# Patient Record
Sex: FEMALE | URBAN - METROPOLITAN AREA
[De-identification: names, ages, dates, MRNs, and addresses within clinical notes are randomized per-mention and may not be internally consistent; named-entity substitution may affect disease eponyms.]

---

## 2021-06-17 ENCOUNTER — APPOINTMENT (OUTPATIENT)
Dept: URBAN - METROPOLITAN AREA CLINIC 273 | Age: 21
Setting detail: DERMATOLOGY
End: 2021-06-17

## 2021-06-17 DIAGNOSIS — D22 MELANOCYTIC NEVI: ICD-10-CM

## 2021-06-17 DIAGNOSIS — L738 OTHER SPECIFIED DISEASES OF HAIR AND HAIR FOLLICLES: ICD-10-CM

## 2021-06-17 DIAGNOSIS — L663 OTHER SPECIFIED DISEASES OF HAIR AND HAIR FOLLICLES: ICD-10-CM

## 2021-06-17 PROBLEM — L02.222 FURUNCLE OF BACK [ANY PART, EXCEPT BUTTOCK]: Status: ACTIVE | Noted: 2021-06-17

## 2021-06-17 PROBLEM — L02.12 FURUNCLE OF NECK: Status: ACTIVE | Noted: 2021-06-17

## 2021-06-17 PROBLEM — D22.62 MELANOCYTIC NEVI OF LEFT UPPER LIMB, INCLUDING SHOULDER: Status: ACTIVE | Noted: 2021-06-17

## 2021-06-17 PROCEDURE — OTHER COUNSELING: OTHER

## 2021-06-17 PROCEDURE — 99203 OFFICE O/P NEW LOW 30 MIN: CPT

## 2021-06-17 PROCEDURE — OTHER PRESCRIPTION: OTHER

## 2021-06-17 PROCEDURE — OTHER SUNSCREEN RECOMMENDATIONS: OTHER

## 2021-06-17 PROCEDURE — OTHER REASSURANCE: OTHER

## 2021-06-17 RX ORDER — CLINDAMYCIN PHOSPHATE 10 MG/1
SWAB TOPICAL
Qty: 1 | Refills: 6 | Status: ERX | COMMUNITY
Start: 2021-06-17

## 2021-06-17 ASSESSMENT — LOCATION DETAILED DESCRIPTION DERM
LOCATION DETAILED: LEFT PROXIMAL DORSAL FOREARM
LOCATION DETAILED: SUPERIOR THORACIC SPINE
LOCATION DETAILED: LEFT INFERIOR LATERAL NECK
LOCATION DETAILED: RIGHT SUPERIOR MEDIAL UPPER BACK

## 2021-06-17 ASSESSMENT — LOCATION ZONE DERM
LOCATION ZONE: NECK
LOCATION ZONE: TRUNK
LOCATION ZONE: ARM

## 2021-06-17 ASSESSMENT — LOCATION SIMPLE DESCRIPTION DERM
LOCATION SIMPLE: LEFT FOREARM
LOCATION SIMPLE: RIGHT UPPER BACK
LOCATION SIMPLE: LEFT ANTERIOR NECK
LOCATION SIMPLE: UPPER BACK

## 2021-06-17 NOTE — HPI: SKIN LESION
What Type Of Note Output Would You Prefer (Optional)?: Bullet Format
How Severe Is Your Skin Lesion?: mild
Has Your Skin Lesion Been Treated?: not been treated
Is This A New Presentation, Or A Follow-Up?: Mole
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 Just wanted it checked for reassurance.

## 2021-06-17 NOTE — HPI: PIMPLES (ACNE)
What Type Of Note Output Would You Prefer (Optional)?: Bullet Format
How Severe Is Your Acne?: mild
Is This A New Presentation, Or A Follow-Up?: Acne
Additional Comments (Use Complete Sentences): She is used clindamycin gel for face

## 2022-03-07 ENCOUNTER — OFFICE (OUTPATIENT)
Dept: URBAN - METROPOLITAN AREA CLINIC 84 | Facility: CLINIC | Age: 22
End: 2022-03-07

## 2022-03-07 VITALS
HEART RATE: 140 BPM | TEMPERATURE: 97.6 F | HEIGHT: 62 IN | DIASTOLIC BLOOD PRESSURE: 80 MMHG | SYSTOLIC BLOOD PRESSURE: 140 MMHG | WEIGHT: 133 LBS

## 2022-03-07 DIAGNOSIS — K92.1 MELENA: ICD-10-CM

## 2022-03-07 PROCEDURE — 99204 OFFICE O/P NEW MOD 45 MIN: CPT | Performed by: SPECIALIST

## 2022-03-09 ENCOUNTER — AMBULATORY SURGICAL CENTER (OUTPATIENT)
Dept: URBAN - METROPOLITAN AREA SURGERY 19 | Facility: SURGERY | Age: 22
End: 2022-03-09

## 2022-03-09 ENCOUNTER — OFFICE (OUTPATIENT)
Dept: URBAN - METROPOLITAN AREA PATHOLOGY 24 | Facility: PATHOLOGY | Age: 22
End: 2022-03-09

## 2022-03-09 DIAGNOSIS — D12.5 BENIGN NEOPLASM OF SIGMOID COLON: ICD-10-CM

## 2022-03-09 DIAGNOSIS — K92.1 MELENA: ICD-10-CM

## 2022-03-09 LAB
COLONOSCOPY STUDY: (no result)
COLONOSCOPY STUDY: (no result)

## 2022-03-09 PROCEDURE — 88305 TISSUE EXAM BY PATHOLOGIST: CPT | Performed by: STUDENT IN AN ORGANIZED HEALTH CARE EDUCATION/TRAINING PROGRAM

## 2022-03-09 PROCEDURE — 45385 COLONOSCOPY W/LESION REMOVAL: CPT | Performed by: SPECIALIST

## 2022-04-19 ENCOUNTER — OFFICE (OUTPATIENT)
Dept: URBAN - METROPOLITAN AREA CLINIC 67 | Facility: CLINIC | Age: 22
End: 2022-04-19

## 2022-04-19 VITALS
WEIGHT: 134 LBS | HEIGHT: 62 IN | DIASTOLIC BLOOD PRESSURE: 78 MMHG | SYSTOLIC BLOOD PRESSURE: 138 MMHG | HEART RATE: 91 BPM

## 2022-04-19 DIAGNOSIS — Z86.010 PERSONAL HISTORY OF COLONIC POLYPS: ICD-10-CM

## 2022-04-19 DIAGNOSIS — K58.9 IRRITABLE BOWEL SYNDROME WITHOUT DIARRHEA: ICD-10-CM

## 2022-04-19 PROCEDURE — 99214 OFFICE O/P EST MOD 30 MIN: CPT | Performed by: SPECIALIST

## 2023-11-01 ENCOUNTER — OFFICE (OUTPATIENT)
Dept: URBAN - METROPOLITAN AREA CLINIC 67 | Facility: CLINIC | Age: 23
End: 2023-11-01

## 2023-11-01 VITALS — WEIGHT: 130 LBS | HEIGHT: 62 IN

## 2023-11-01 DIAGNOSIS — Z86.010 PERSONAL HISTORY OF COLONIC POLYPS: ICD-10-CM

## 2023-11-01 DIAGNOSIS — K59.00 CONSTIPATION, UNSPECIFIED: ICD-10-CM

## 2023-11-01 DIAGNOSIS — K92.1 MELENA: ICD-10-CM

## 2023-11-01 PROCEDURE — 99213 OFFICE O/P EST LOW 20 MIN: CPT | Mod: 95 | Performed by: NURSE PRACTITIONER

## 2023-11-01 RX ORDER — LINACLOTIDE 72 UG/1
CAPSULE, GELATIN COATED ORAL
Qty: 12 | Refills: 0 | Status: ACTIVE
Start: 2023-11-01

## 2023-11-01 NOTE — SERVICENOTES
Telehealth Platform Used: D[employer]im[employer]
Location of patient: Home
Location of provider: Office
Other persons participating: None

## 2023-11-01 NOTE — SERVICEHPINOTES
4/19/2022 Last Visit with Dr. Valderrama:Fabien Mijares is seen today for a follow-up visit. This patient had a colonoscopy 6 weeks ago. Procedure findings included colon polyps. otherwise was normal. She overall is doing well she does have some occasional constipation and minimal rectal bleeding. There's been no diarrhea. She does not take the hyoscyamine. She feels stress makes her IBS symptoms worse.. Plan from 4/19/2022: brIrritable bowel syndrome I am pleased to say Delon is doing well. She does have some occasional constipation and minimal rectal bleeding. I counseled her on a high-fiber diet, fiber supplementation and taking MiraLAX as needed. She did have recent colonoscopy which did reveal a medium-sized polyp which was removed. She does need colonoscopy in 3 years. She did not want any further blood work such as thyroid testing today. We will plan to see her back for her IBS as needed
br
ally canales br Today 11/1/2023 
br She reports via telehealth with complaints blood in stool x 1. br
ally She has stomach pain that started 3 weeks ago. She went to a Walk In and had a KUB that showed a large amount of stool. She took a Dulcolax that caused a lot of pain until she had a bowel movement. The pain then went away. 
br
ally She has been having constipation for a while and takes Miralax. She also took semaglutide for a time period since we last saw her and she believes it made her constipation worse. br
ally A couple days ago, she took an increased dose of Miralax and then had harder stool with bright red blood in stool. She has not seen blood in the stool since then. She has had multiple bowel movements over the last couple days due to increase in Miralax. ally Villarreal since her polyp removal, she has been very worried about any medical conditions. Procedures br3/9/2022 Colonoscopy with Dr. Valderrama- 20mm polyp, path: TV adenoma, repeat colon in 3 years

## 2023-11-09 ENCOUNTER — OFFICE (OUTPATIENT)
Dept: URBAN - METROPOLITAN AREA CLINIC 67 | Facility: CLINIC | Age: 23
End: 2023-11-09

## 2023-11-09 VITALS — WEIGHT: 129 LBS | HEIGHT: 62 IN

## 2023-11-09 DIAGNOSIS — R10.84 GENERALIZED ABDOMINAL PAIN: ICD-10-CM

## 2023-11-09 DIAGNOSIS — R11.0 NAUSEA: ICD-10-CM

## 2023-11-09 DIAGNOSIS — K59.00 CONSTIPATION, UNSPECIFIED: ICD-10-CM

## 2023-11-09 DIAGNOSIS — Z86.010 PERSONAL HISTORY OF COLONIC POLYPS: ICD-10-CM

## 2023-11-09 PROCEDURE — 99213 OFFICE O/P EST LOW 20 MIN: CPT | Performed by: NURSE PRACTITIONER

## 2023-11-09 NOTE — SERVICEHPINOTES
4/19/2022 Last Visit with Dr. Valderrama: Katlinagustina Mijares is seen today for a follow-up visit. This patient had a colonoscopy 6 weeks ago. Procedure findings included colon polyps. otherwise was normal. She overall is doing well she does have some occasional constipation and minimal rectal bleeding. There's been no diarrhea. She does not take the hyoscyamine. She feels stress makes her IBS symptoms worse.. Plan from 4/19/2022: brIrritable bowel syndrome I am pleased to say Delon is doing well. She does have some occasional constipation and minimal rectal bleeding. I counseled her on a high-fiber diet, fiber supplementation and taking MiraLAX as needed. She did have recent colonoscopy which did reveal a medium-sized polyp which was removed. She does need colonoscopy in 3 years. She did not want any further blood work such as thyroid testing today. We will plan to see her back for her IBS as needed 11/1/2023 Interval History: brShe reports via telehealth with complaints blood in stool x 1. She has stomach pain that started 3 weeks ago. She went to a Walk In and had a KUB that showed a large amount of stool. She took a Dulcolax that caused a lot of pain until she had a bowel movement. The pain then went away. She has been having constipation for a while and takes Miralax. She also took semaglutide for a time period since we last saw her and she believes it made her constipation worse. A couple days ago, she took an increased dose of Miralax and then had harder stool with bright red blood in stool. She has not seen blood in the stool since then. She has had multiple bowel movements over the last couple days due to increase in Miralax. Ever since her polyp removal, she has been very worried about any medical conditions. Plan from 11/1/2023: br1. Will trial a 12 day sample of Linzess since pt has never tried anything for IBS-C br2. Will have pt continue to monitor for blood in stool since she has only 1 episode after constipation/hard stool br3. Will call pt in 2 weeks for follow up of symptoms br4. Colonoscopy due 3/2025 brToday 11/9/2023 DamianRicci Mijares is here via telehealth. She started taking the Linzess and it worked the first couple days, but had abdominal pain when she took it. She took it for 3 days and then stopped due to the pain. br
Luis has continued to have abdominal pain daily. ally canales Today she has had 3 formed bowel movements a day. She has not had any blood in her stool. ally canales She has been feeling very stressed recently as she is about to have to move to [place] next Friday for work. She will live there for 1 year, but wants to continue to follow up with us. ally canales She also feels nauseous and has been taking Zofran that helps symptoms. br
br
br Imaging 
br 10/10/2023 KUB- moderate colonic stool burden, no obstruction Procedures br3/9/2022 Colonoscopy with Dr. Valderrama- 20mm polyp, path: TV adenoma, repeat colon in 3 years

## 2023-11-16 ENCOUNTER — OFFICE (OUTPATIENT)
Dept: URBAN - METROPOLITAN AREA CLINIC 67 | Facility: CLINIC | Age: 23
End: 2023-11-16

## 2023-11-16 VITALS — WEIGHT: 129 LBS | HEIGHT: 62 IN

## 2023-11-16 DIAGNOSIS — R10.84 GENERALIZED ABDOMINAL PAIN: ICD-10-CM

## 2023-11-16 DIAGNOSIS — Z86.010 PERSONAL HISTORY OF COLONIC POLYPS: ICD-10-CM

## 2023-11-16 DIAGNOSIS — K52.9 NONINFECTIVE GASTROENTERITIS AND COLITIS, UNSPECIFIED: ICD-10-CM

## 2023-11-16 PROCEDURE — 99213 OFFICE O/P EST LOW 20 MIN: CPT | Mod: 95 | Performed by: NURSE PRACTITIONER

## 2023-11-16 RX ORDER — DICYCLOMINE HYDROCHLORIDE 10 MG/1
CAPSULE ORAL
Qty: 270 | Refills: 0 | Status: ACTIVE
Start: 2023-11-16

## 2024-01-10 ENCOUNTER — TELEHEALTH PROVIDED OTHER THAN IN PATIENT'S HOME (OUTPATIENT)
Dept: URBAN - METROPOLITAN AREA CLINIC 67 | Facility: CLINIC | Age: 24
End: 2024-01-10

## 2024-01-10 VITALS — SYSTOLIC BLOOD PRESSURE: 117 MMHG | HEIGHT: 62 IN | DIASTOLIC BLOOD PRESSURE: 83 MMHG | WEIGHT: 130 LBS

## 2024-01-10 DIAGNOSIS — Z86.010 PERSONAL HISTORY OF COLONIC POLYPS: ICD-10-CM

## 2024-01-10 DIAGNOSIS — K92.1 MELENA: ICD-10-CM

## 2024-01-10 PROCEDURE — 99214 OFFICE O/P EST MOD 30 MIN: CPT | Mod: GT | Performed by: SPECIALIST

## 2024-01-10 NOTE — SERVICEHPINOTES
This patient is seen for evaluation of blood per rectum.   Symptoms began   4     months   ago.   Bleeding has occurred at a frequency of   2   times per   month  .    The patient estimates seeing   a teaspoon   of   bright red   blood in the stool.    Stools have been   normal   in consistency.    Has noted    in association with the bleeding.   Previous pertinent conditions that have been diagnosed in relation to this problem include   colon polyps  .    The patient has tried    for empiric treatment of suspected hemorrhoids.    Prior notable interventions/surgeries include   .    A colonoscopy was performed   2  years   ago.  Findings from that exam included   colon polyps  .

## 2024-01-10 NOTE — SERVICENOTES
Telehealth Platform Used: [employer]
Location of patient:work
Location of provider: Office
Other persons participating: no